# Patient Record
Sex: FEMALE | Race: WHITE | NOT HISPANIC OR LATINO
[De-identification: names, ages, dates, MRNs, and addresses within clinical notes are randomized per-mention and may not be internally consistent; named-entity substitution may affect disease eponyms.]

---

## 2022-10-27 PROBLEM — Z00.00 ENCOUNTER FOR PREVENTIVE HEALTH EXAMINATION: Status: ACTIVE | Noted: 2022-10-27

## 2022-11-16 ENCOUNTER — APPOINTMENT (OUTPATIENT)
Dept: ENDOCRINOLOGY | Facility: CLINIC | Age: 27
End: 2022-11-16

## 2022-11-16 ENCOUNTER — NON-APPOINTMENT (OUTPATIENT)
Age: 27
End: 2022-11-16

## 2022-11-16 VITALS
WEIGHT: 153 LBS | RESPIRATION RATE: 16 BRPM | SYSTOLIC BLOOD PRESSURE: 110 MMHG | DIASTOLIC BLOOD PRESSURE: 60 MMHG | BODY MASS INDEX: 25.49 KG/M2 | OXYGEN SATURATION: 99 % | HEIGHT: 65 IN | TEMPERATURE: 98 F | HEART RATE: 74 BPM

## 2022-11-16 DIAGNOSIS — Z78.9 OTHER SPECIFIED HEALTH STATUS: ICD-10-CM

## 2022-11-16 DIAGNOSIS — E28.2 POLYCYSTIC OVARIAN SYNDROME: ICD-10-CM

## 2022-11-16 PROCEDURE — 99244 OFF/OP CNSLTJ NEW/EST MOD 40: CPT

## 2022-11-16 NOTE — CONSULT LETTER
[Dear  ___] : Dear  [unfilled], [Sincerely,] : Sincerely, [FreeTextEntry1] : Thank you for referring  Ms. ELHAM MORA to me for evaluation and treatment. Please, see attached consultation note. As always, if there are specific questions you would like to discuss, please feel free to contact me.\par Thank you for the courtesy of this evaluation.\par  [FreeTextEntry3] : Simon Petty MD, FACE, [facility]\par

## 2022-11-16 NOTE — HISTORY OF PRESENT ILLNESS
[FreeTextEntry1] : 27 year  female referred for PCOS management\par \par Ms Hurt complains of  irregular menses x many years. On OCP's from 17 yo to 27 yo which helped with acne, periods.\par Since coming off OCP's periods became less regular again, and she was diagnosed with PCOS about a year ago in settings of ovarian cysts, acne and some facial hair growth. She was placed on metformin, but she felt bloated when the dose was increased to a BID and subsequently TID. She discontinued metformin after 3-4 months, and is presently not taking any medications. \par Menarche at 14 yo. LMP -  10/12/22\par She's sexually active, not using any contraception and is considering getting pregnant within the next 6 months.\par She has never been pregnant before\par Some increased facial/body hair growth, and hair is getting darker, but no significant scalp hair loss The weight has been fluctuating (had an eating disorder while in college), but recently her life style has become healthier. She's exercising 4-5 days a week (HIIT, walks), and is watching her diet. \par Denies: breast discharge, shoe/ring size change, easy bruising or new purple stretch marks on the abdomen/thighs. \par Last gyn exam-  about 4 months ago\par Pelvic US in 2021\par \par

## 2022-11-16 NOTE — ASSESSMENT
[FreeTextEntry1] : Likely an underlying PCOS, but would need to exclude other endocrinopathies, including NC-CAH .\par  - will check a 3rd day am ASTD, DHEAS, testosterone, estradiol, gonadotropins, prolactin, IGF-1, insulin/glucose levels and 17-OHP/pregnenolone\par If suspicious for NC-CAH, will proceed with ACTH stimulation test +/- genetic testing. \par Potential therapeutic options were reviewed in details.  Since patient is planning to conceive within the next 6 months, spironolactone and birth control pills are not a viable option.  We did discuss the potential use of a small dose of metformin extended release, although in settings of no insulin resistance the benefits might be questionable.\par Once the blood work is completed, we will review the results and discuss our further course of action.\par

## 2022-11-28 LAB
17OHP SERPL-MCNC: 66 NG/DL
25(OH)D3 SERPL-MCNC: 31.1 NG/ML
ALBUMIN SERPL ELPH-MCNC: 4.5 G/DL
ALP BLD-CCNC: 65 U/L
ALT SERPL-CCNC: 11 U/L
ANDROST SERPL-MCNC: 133 NG/DL
ANION GAP SERPL CALC-SCNC: 12 MMOL/L
ANTI-MUELLERIAN HORMONE: 3.78 NG/ML
AST SERPL-CCNC: 16 U/L
BILIRUB SERPL-MCNC: 0.6 MG/DL
BUN SERPL-MCNC: 9 MG/DL
CALCIUM SERPL-MCNC: 9.7 MG/DL
CHLORIDE SERPL-SCNC: 106 MMOL/L
CHOLEST SERPL-MCNC: 131 MG/DL
CO2 SERPL-SCNC: 23 MMOL/L
CREAT SERPL-MCNC: 0.87 MG/DL
DHEA-S SERPL-MCNC: 146 UG/DL
EGFR: 93 ML/MIN/1.73M2
ESTIMATED AVERAGE GLUCOSE: 100 MG/DL
ESTRADIOL SERPL-MCNC: 29 PG/ML
FSH SERPL-MCNC: 5.8 IU/L
GLUCOSE SERPL-MCNC: 88 MG/DL
HBA1C MFR BLD HPLC: 5.1 %
HCG SERPL QL: NEGATIVE
HDLC SERPL-MCNC: 64 MG/DL
IGF-1 INTERP: NORMAL
IGF-I BLD-MCNC: 241 NG/ML
INSULIN P FAST SERPL-ACNC: 6.2 UU/ML
LDLC SERPL CALC-MCNC: 51 MG/DL
LH SERPL-ACNC: 13 IU/L
NONHDLC SERPL-MCNC: 67 MG/DL
PAPP-A SERPL-ACNC: <1 MIU/ML
POTASSIUM SERPL-SCNC: 4.6 MMOL/L
PROLACTIN SERPL-MCNC: 33.2 NG/ML
PROT SERPL-MCNC: 6.3 G/DL
SHBG SERPL-SCNC: 92.8 NMOL/L
SODIUM SERPL-SCNC: 140 MMOL/L
T4 FREE SERPL-MCNC: 1.3 NG/DL
TESTOST FREE SERPL-MCNC: 5.2 PG/ML
TESTOST SERPL-MCNC: 28.5 NG/DL
TRIGL SERPL-MCNC: 79 MG/DL
TSH SERPL-ACNC: 1.47 UIU/ML

## 2022-12-05 ENCOUNTER — NON-APPOINTMENT (OUTPATIENT)
Age: 27
End: 2022-12-05

## 2022-12-06 LAB — 17OH-PREG SERPL-MCNC: 625 NG/DL

## 2022-12-07 LAB
PROLACTIN SERPL-MCNC: 13.6 NG/ML
PROLACTIN SERPL-MCNC: 14.2 NG/ML

## 2022-12-15 ENCOUNTER — APPOINTMENT (OUTPATIENT)
Dept: OTOLARYNGOLOGY | Facility: CLINIC | Age: 27
End: 2022-12-15

## 2022-12-15 VITALS
SYSTOLIC BLOOD PRESSURE: 124 MMHG | DIASTOLIC BLOOD PRESSURE: 80 MMHG | WEIGHT: 153 LBS | TEMPERATURE: 97.5 F | HEIGHT: 65 IN | HEART RATE: 74 BPM | BODY MASS INDEX: 25.49 KG/M2

## 2022-12-15 DIAGNOSIS — Z78.9 OTHER SPECIFIED HEALTH STATUS: ICD-10-CM

## 2022-12-15 DIAGNOSIS — Z86.59 PERSONAL HISTORY OF OTHER MENTAL AND BEHAVIORAL DISORDERS: ICD-10-CM

## 2022-12-15 DIAGNOSIS — Z80.42 FAMILY HISTORY OF MALIGNANT NEOPLASM OF PROSTATE: ICD-10-CM

## 2022-12-15 PROCEDURE — 99204 OFFICE O/P NEW MOD 45 MIN: CPT

## 2022-12-15 RX ORDER — ERGOCALCIFEROL (VITAMIN D2) 10 MCG
TABLET ORAL
Refills: 0 | Status: ACTIVE | COMMUNITY

## 2022-12-15 NOTE — PHYSICAL EXAM
[Nasal Endoscopy Performed] : nasal endoscopy was performed, see procedure section for findings [Normal] : palatine tonsils are normal [de-identified] : There is a submucosal tonsil stone in the L tonsil, unable to express it today.

## 2022-12-15 NOTE — HISTORY OF PRESENT ILLNESS
[de-identified] : 27 year old female referred by urgent care for white spot in throat. States first noticed the white spot 6 months ago, went to urgent care was advised it was food particle to just keep monitoring, 2 weeks ago went to dentist was advised to follow up. States she is worried, thinks it has increased in size, and at times throat feels scratchy has the need to tug on her left ear. Patient denies throat infections, fevers, dysphagia, odynophagia, dyspnea, dysphonia or otalgia. \par \par

## 2023-04-24 ENCOUNTER — APPOINTMENT (OUTPATIENT)
Dept: ENDOCRINOLOGY | Facility: CLINIC | Age: 28
End: 2023-04-24

## 2023-11-22 ENCOUNTER — APPOINTMENT (OUTPATIENT)
Age: 28
End: 2023-11-22

## 2023-11-22 ENCOUNTER — APPOINTMENT (OUTPATIENT)
Age: 28
End: 2023-11-22
Payer: COMMERCIAL

## 2023-11-22 PROCEDURE — S9443: CPT | Mod: 95

## 2024-12-15 ENCOUNTER — NON-APPOINTMENT (OUTPATIENT)
Age: 29
End: 2024-12-15